# Patient Record
Sex: FEMALE | Race: WHITE | NOT HISPANIC OR LATINO | Employment: STUDENT | URBAN - METROPOLITAN AREA
[De-identification: names, ages, dates, MRNs, and addresses within clinical notes are randomized per-mention and may not be internally consistent; named-entity substitution may affect disease eponyms.]

---

## 2018-06-14 ENCOUNTER — OFFICE VISIT (OUTPATIENT)
Dept: INTERNAL MEDICINE | Facility: CLINIC | Age: 23
End: 2018-06-14
Payer: COMMERCIAL

## 2018-06-14 ENCOUNTER — LAB VISIT (OUTPATIENT)
Dept: LAB | Facility: HOSPITAL | Age: 23
End: 2018-06-14
Payer: COMMERCIAL

## 2018-06-14 VITALS
OXYGEN SATURATION: 97 % | HEART RATE: 64 BPM | SYSTOLIC BLOOD PRESSURE: 106 MMHG | HEIGHT: 67 IN | BODY MASS INDEX: 22.81 KG/M2 | DIASTOLIC BLOOD PRESSURE: 66 MMHG | WEIGHT: 145.31 LBS

## 2018-06-14 DIAGNOSIS — Z76.89 ENCOUNTER TO ESTABLISH CARE: ICD-10-CM

## 2018-06-14 DIAGNOSIS — F32.A DEPRESSION, UNSPECIFIED DEPRESSION TYPE: ICD-10-CM

## 2018-06-14 DIAGNOSIS — R53.83 FATIGUE, UNSPECIFIED TYPE: ICD-10-CM

## 2018-06-14 DIAGNOSIS — R53.83 FATIGUE, UNSPECIFIED TYPE: Primary | ICD-10-CM

## 2018-06-14 LAB
ALBUMIN SERPL BCP-MCNC: 4.3 G/DL
ALP SERPL-CCNC: 63 U/L
ALT SERPL W/O P-5'-P-CCNC: 13 U/L
ANION GAP SERPL CALC-SCNC: 9 MMOL/L
AST SERPL-CCNC: 17 U/L
BASOPHILS # BLD AUTO: 0.02 K/UL
BASOPHILS NFR BLD: 0.2 %
BILIRUB SERPL-MCNC: 0.4 MG/DL
BUN SERPL-MCNC: 9 MG/DL
CALCIUM SERPL-MCNC: 10 MG/DL
CHLORIDE SERPL-SCNC: 103 MMOL/L
CO2 SERPL-SCNC: 26 MMOL/L
CREAT SERPL-MCNC: 0.7 MG/DL
DIFFERENTIAL METHOD: NORMAL
EOSINOPHIL # BLD AUTO: 0.1 K/UL
EOSINOPHIL NFR BLD: 1.3 %
ERYTHROCYTE [DISTWIDTH] IN BLOOD BY AUTOMATED COUNT: 12.6 %
EST. GFR  (AFRICAN AMERICAN): >60 ML/MIN/1.73 M^2
EST. GFR  (NON AFRICAN AMERICAN): >60 ML/MIN/1.73 M^2
GLUCOSE SERPL-MCNC: 89 MG/DL
HCT VFR BLD AUTO: 39.3 %
HGB BLD-MCNC: 13.4 G/DL
LYMPHOCYTES # BLD AUTO: 2.1 K/UL
LYMPHOCYTES NFR BLD: 24.4 %
MCH RBC QN AUTO: 29.6 PG
MCHC RBC AUTO-ENTMCNC: 34.1 G/DL
MCV RBC AUTO: 87 FL
MONOCYTES # BLD AUTO: 0.6 K/UL
MONOCYTES NFR BLD: 7.2 %
NEUTROPHILS # BLD AUTO: 5.9 K/UL
NEUTROPHILS NFR BLD: 66.6 %
PLATELET # BLD AUTO: 210 K/UL
PMV BLD AUTO: 10.1 FL
POTASSIUM SERPL-SCNC: 3.7 MMOL/L
PROT SERPL-MCNC: 7.8 G/DL
RBC # BLD AUTO: 4.52 M/UL
SODIUM SERPL-SCNC: 138 MMOL/L
T4 FREE SERPL-MCNC: 0.96 NG/DL
TSH SERPL DL<=0.005 MIU/L-ACNC: 2.14 UIU/ML
WBC # BLD AUTO: 8.78 K/UL

## 2018-06-14 PROCEDURE — 85025 COMPLETE CBC W/AUTO DIFF WBC: CPT

## 2018-06-14 PROCEDURE — 3008F BODY MASS INDEX DOCD: CPT | Mod: CPTII,S$GLB,, | Performed by: STUDENT IN AN ORGANIZED HEALTH CARE EDUCATION/TRAINING PROGRAM

## 2018-06-14 PROCEDURE — 99999 PR PBB SHADOW E&M-EST. PATIENT-LVL III: CPT | Mod: PBBFAC,,, | Performed by: STUDENT IN AN ORGANIZED HEALTH CARE EDUCATION/TRAINING PROGRAM

## 2018-06-14 PROCEDURE — 84439 ASSAY OF FREE THYROXINE: CPT

## 2018-06-14 PROCEDURE — 80053 COMPREHEN METABOLIC PANEL: CPT

## 2018-06-14 PROCEDURE — 84443 ASSAY THYROID STIM HORMONE: CPT

## 2018-06-14 PROCEDURE — 36415 COLL VENOUS BLD VENIPUNCTURE: CPT

## 2018-06-14 PROCEDURE — 99204 OFFICE O/P NEW MOD 45 MIN: CPT | Mod: S$GLB,,, | Performed by: STUDENT IN AN ORGANIZED HEALTH CARE EDUCATION/TRAINING PROGRAM

## 2018-06-14 RX ORDER — SERTRALINE HYDROCHLORIDE 100 MG/1
100 TABLET, FILM COATED ORAL DAILY
COMMUNITY

## 2018-06-14 RX ORDER — LORAZEPAM 0.5 MG/1
0.5 TABLET ORAL
COMMUNITY

## 2018-06-14 NOTE — PROGRESS NOTES
"Subjective:       Patient ID: Lara Hill is a 22 y.o. female.    Chief Complaint: Fatigue; Constipation; and Thyroid Problem    Patient is a 22 y.o.  female who's PMH is significant for depression and anxiety (managed by an outpatient psychiatrist) who presents for fatigue and lethargy experienced over the past year with increase over the past 3 months. Patient reports that she feels extremely tired even after sleeping full 8-9 hours and utilizing good sleep hygiene. Patient further reports chronic constipation, extreme cold intolerance, 10 lb weight gain over the past year and self-reported worsening of depression. Patient states that she has been taking Zoloft since she was 18 and her psychiatrist had her increase her dose from 50 to 75 and then 100 mg within the past few months. Patient reports that she will also occasionally take Ativan once or twice a week if she experiences anxiety symptoms. Patient reports that she has a strong family history of hypothyroidism in her mother and maternal grandmother who were all diagnosed in their mid 20's.     Of note, patient labs all resulted normal for thyroid function, CMP and CBC. At this time it seems that the patients reported weakness, "mental fog," and fatigue are likely signs and symptoms of depression. Patient denies SI/HI intent or plan at this time. Reced that patient f/u with her psychiatrist as she does not have any medical/metabolic cause of fatigue and lethargy.        Review of Systems   Constitutional: Negative for chills and fever.   HENT: Negative.    Eyes: Negative for visual disturbance.   Respiratory: Negative for cough, choking and shortness of breath.    Cardiovascular: Negative for chest pain and leg swelling.   Gastrointestinal: Positive for constipation. Negative for abdominal pain, diarrhea, nausea and vomiting.   Endocrine: Positive for cold intolerance. Negative for heat intolerance and polyuria.   Genitourinary: Negative for " difficulty urinating and flank pain.   Musculoskeletal: Negative.  Negative for back pain, joint swelling and myalgias.   Skin: Negative.    Neurological: Negative for weakness and headaches.   Hematological: Does not bruise/bleed easily.   Psychiatric/Behavioral: Positive for dysphoric mood. Negative for self-injury, sleep disturbance and suicidal ideas. The patient is nervous/anxious.        Objective:      Physical Exam   Constitutional: She is oriented to person, place, and time. She appears well-developed and well-nourished. No distress.   HENT:   Head: Normocephalic and atraumatic.   Mouth/Throat: No oropharyngeal exudate.   Eyes: EOM are normal. Pupils are equal, round, and reactive to light. No scleral icterus.   Neck: Normal range of motion. Neck supple. No JVD present.   Cardiovascular: Normal rate, regular rhythm, normal heart sounds and intact distal pulses.  Exam reveals no friction rub.    No murmur heard.  Pulmonary/Chest: Effort normal and breath sounds normal. No respiratory distress. She has no wheezes.   Abdominal: Soft. Bowel sounds are normal. She exhibits no distension and no mass. There is no tenderness.   Musculoskeletal: Normal range of motion. She exhibits no edema.   Neurological: She is alert and oriented to person, place, and time. No cranial nerve deficit. Coordination normal.   Skin: Skin is warm and dry. Capillary refill takes less than 2 seconds. She is not diaphoretic. No erythema.   Psychiatric: She has a normal mood and affect.   Nursing note and vitals reviewed.      Assessment:       1. Fatigue, unspecified type    2. Depression, unspecified depression type    3. Encounter to establish care        Plan:       Lara was seen today for fatigue, constipation and thyroid problem.    Diagnoses and all orders for this visit:    Fatigue, unspecified type    -     CBC auto differential; Future  -     Comprehensive metabolic panel; Future  -     TSH; Future  -     T4, free;  Future    Depression, unspecified depression type     - Continue Zoloft 100mg QD as it is prescribed by her psychiatrist   - Consider decreasing dose as patient is experiencing side effects     Encounter to establish care    -     CBC auto differential; Future  -     Comprehensive metabolic panel; Future  -     TSH; Future  -     T4, free; Future    Lab Results   Component Value Date    TSH 2.139 06/14/2018     CMP  Sodium   Date Value Ref Range Status   06/14/2018 138 136 - 145 mmol/L Final     Potassium   Date Value Ref Range Status   06/14/2018 3.7 3.5 - 5.1 mmol/L Final     Chloride   Date Value Ref Range Status   06/14/2018 103 95 - 110 mmol/L Final     CO2   Date Value Ref Range Status   06/14/2018 26 23 - 29 mmol/L Final     Glucose   Date Value Ref Range Status   06/14/2018 89 70 - 110 mg/dL Final     BUN, Bld   Date Value Ref Range Status   06/14/2018 9 6 - 20 mg/dL Final     Creatinine   Date Value Ref Range Status   06/14/2018 0.7 0.5 - 1.4 mg/dL Final     Calcium   Date Value Ref Range Status   06/14/2018 10.0 8.7 - 10.5 mg/dL Final     Total Protein   Date Value Ref Range Status   06/14/2018 7.8 6.0 - 8.4 g/dL Final     Albumin   Date Value Ref Range Status   06/14/2018 4.3 3.5 - 5.2 g/dL Final     Total Bilirubin   Date Value Ref Range Status   06/14/2018 0.4 0.1 - 1.0 mg/dL Final     Comment:     For infants and newborns, interpretation of results should be based  on gestational age, weight and in agreement with clinical  observations.  Premature Infant recommended reference ranges:  Up to 24 hours.............<8.0 mg/dL  Up to 48 hours............<12.0 mg/dL  3-5 days..................<15.0 mg/dL  6-29 days.................<15.0 mg/dL       Alkaline Phosphatase   Date Value Ref Range Status   06/14/2018 63 55 - 135 U/L Final     AST   Date Value Ref Range Status   06/14/2018 17 10 - 40 U/L Final     ALT   Date Value Ref Range Status   06/14/2018 13 10 - 44 U/L Final     Anion Gap   Date Value Ref  Range Status   06/14/2018 9 8 - 16 mmol/L Final     eGFR if    Date Value Ref Range Status   06/14/2018 >60 >60 mL/min/1.73 m^2 Final     eGFR if non    Date Value Ref Range Status   06/14/2018 >60 >60 mL/min/1.73 m^2 Final     Comment:     Calculation used to obtain the estimated glomerular filtration  rate (eGFR) is the CKD-EPI equation.        CMP  Sodium   Date Value Ref Range Status   06/14/2018 138 136 - 145 mmol/L Final     Potassium   Date Value Ref Range Status   06/14/2018 3.7 3.5 - 5.1 mmol/L Final     Chloride   Date Value Ref Range Status   06/14/2018 103 95 - 110 mmol/L Final     CO2   Date Value Ref Range Status   06/14/2018 26 23 - 29 mmol/L Final     Glucose   Date Value Ref Range Status   06/14/2018 89 70 - 110 mg/dL Final     BUN, Bld   Date Value Ref Range Status   06/14/2018 9 6 - 20 mg/dL Final     Creatinine   Date Value Ref Range Status   06/14/2018 0.7 0.5 - 1.4 mg/dL Final     Calcium   Date Value Ref Range Status   06/14/2018 10.0 8.7 - 10.5 mg/dL Final     Total Protein   Date Value Ref Range Status   06/14/2018 7.8 6.0 - 8.4 g/dL Final     Albumin   Date Value Ref Range Status   06/14/2018 4.3 3.5 - 5.2 g/dL Final     Total Bilirubin   Date Value Ref Range Status   06/14/2018 0.4 0.1 - 1.0 mg/dL Final     Comment:     For infants and newborns, interpretation of results should be based  on gestational age, weight and in agreement with clinical  observations.  Premature Infant recommended reference ranges:  Up to 24 hours.............<8.0 mg/dL  Up to 48 hours............<12.0 mg/dL  3-5 days..................<15.0 mg/dL  6-29 days.................<15.0 mg/dL       Alkaline Phosphatase   Date Value Ref Range Status   06/14/2018 63 55 - 135 U/L Final     AST   Date Value Ref Range Status   06/14/2018 17 10 - 40 U/L Final     ALT   Date Value Ref Range Status   06/14/2018 13 10 - 44 U/L Final     Anion Gap   Date Value Ref Range Status   06/14/2018 9 8 - 16  "mmol/L Final     eGFR if    Date Value Ref Range Status   06/14/2018 >60 >60 mL/min/1.73 m^2 Final     eGFR if non    Date Value Ref Range Status   06/14/2018 >60 >60 mL/min/1.73 m^2 Final     Comment:     Calculation used to obtain the estimated glomerular filtration  rate (eGFR) is the CKD-EPI equation.        Of note, patient labs all resulted normal for thyroid function, CMP and CBC. At this time it seems that the patients reported weakness, "mental fog," and fatigue are likely signs and symptoms of depression. Patient denies SI/HI intent or plan at this time. Reced that patient f/u with her psychiatrist as she does not have any medical/metabolic cause of fatigue and lethargy.             Plan discussed with attending Dr. Valencia, further recommendations as per attending addendum. Please feel free to call with any questions or concerns.        Anthony Barrientos MD  Internal Medicine Resident     "

## 2018-06-18 ENCOUNTER — TELEPHONE (OUTPATIENT)
Dept: HEPATOLOGY | Facility: HOSPITAL | Age: 23
End: 2018-06-18

## 2018-06-18 NOTE — TELEPHONE ENCOUNTER
Called patient regarding normal lab results and told her that it would be best if she spoke to her psychiatrist regarding decreasing zoloft/ativan.             Anthony Barrientos MD  Resident Physician - PGY1